# Patient Record
Sex: FEMALE | Race: WHITE | NOT HISPANIC OR LATINO | Employment: OTHER | URBAN - METROPOLITAN AREA
[De-identification: names, ages, dates, MRNs, and addresses within clinical notes are randomized per-mention and may not be internally consistent; named-entity substitution may affect disease eponyms.]

---

## 2022-06-04 ENCOUNTER — HOSPITAL ENCOUNTER (EMERGENCY)
Facility: HOSPITAL | Age: 62
Discharge: HOME | End: 2022-06-05
Attending: GENERAL ACUTE CARE HOSPITAL
Payer: COMMERCIAL

## 2022-06-04 VITALS
HEIGHT: 64 IN | TEMPERATURE: 98 F | BODY MASS INDEX: 13.21 KG/M2 | OXYGEN SATURATION: 99 % | WEIGHT: 77.38 LBS | DIASTOLIC BLOOD PRESSURE: 77 MMHG | RESPIRATION RATE: 20 BRPM | SYSTOLIC BLOOD PRESSURE: 113 MMHG | HEART RATE: 73 BPM
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
## 2022-06-04 DIAGNOSIS — S40.012A CONTUSION OF LEFT SHOULDER, INITIAL ENCOUNTER: ICD-10-CM

## 2022-06-04 PROCEDURE — 99283 EMERGENCY DEPT VISIT LOW MDM: CPT | Mod: 25

## 2022-06-05 NOTE — DISCHARGE INSTRUCTIONS
Follow-up with PCP in 2-3 days for evaluation and examination, otherwise take Tylenol and Motrin as needed for pain

## 2022-06-05 NOTE — ED PROVIDER NOTES
Encounter Date: 6/4/2022       History     Chief Complaint   Patient presents with    Shoulder Pain     Pt c/o L shoulder pain starting today after she fell onto some boxes.     Patient came in emergency room res chief complaints on the left shoulder pain, reports history of fall with trauma 4 hours ago, AROM of left shoulder are preserved but painful, no history of head injury        Review of patient's allergies indicates:   Allergen Reactions    Propoxyphene n-acetaminophen      Other reaction(s): nausea     History reviewed. No pertinent past medical history.  History reviewed. No pertinent surgical history.  History reviewed. No pertinent family history.     Review of Systems   Musculoskeletal: Positive for arthralgias and myalgias.   All other systems reviewed and are negative.      Physical Exam     Initial Vitals [06/04/22 2350]   BP Pulse Resp Temp SpO2   113/77 73 20 97.7 °F (36.5 °C) 99 %      MAP       --         Physical Exam    Nursing note and vitals reviewed.  Constitutional: She appears cachectic.   HENT:   Head: Normocephalic and atraumatic.   Right Ear: External ear normal.   Left Ear: External ear normal.   Nose: Nose normal.   Mouth/Throat: Oropharynx is clear and moist.   Eyes: Conjunctivae and EOM are normal. Pupils are equal, round, and reactive to light.   Neck: Neck supple.   Normal range of motion.  Cardiovascular: Normal rate, regular rhythm and normal heart sounds.   Pulmonary/Chest: Breath sounds normal.   Abdominal: Abdomen is soft. Bowel sounds are normal.   Musculoskeletal:         General: Normal range of motion.      Cervical back: Normal range of motion and neck supple.     Neurological: She is alert and oriented to person, place, and time. She has normal reflexes.   Skin: Skin is warm. Capillary refill takes 2 to 3 seconds.   Psychiatric: She has a normal mood and affect. Her behavior is normal. Judgment and thought content normal.         ED Course   Procedures  Labs Reviewed  - No data to display       Imaging Results          X-Ray Shoulder 2 or More Views Left (In process)               X-Rays:   Independently Interpreted Readings:   Other Readings:  Left shoulder x-ray: no evidence of fracture    Medications - No data to display  Medical Decision Making:   Differential Diagnosis:   Left shoulder fracture, left shoulder contusion             ED Course as of 06/05/22 0033   Sun Jun 05, 2022   0027 X-Ray Shoulder 2 or More Views Left [IP]      ED Course User Index  [IP] Paris Blanton MD             Clinical Impression:   Final diagnoses:  [W19.XXXA] Fall, initial encounter (Primary)  [S40.012A] Contusion of left shoulder, initial encounter          ED Disposition Condition    Discharge Stable        ED Prescriptions     None        Follow-up Information    None          Paris Balnton MD  06/05/22 0034

## 2023-01-01 ENCOUNTER — HOSPITAL ENCOUNTER (EMERGENCY)
Facility: HOSPITAL | Age: 63
Discharge: HOME | End: 2023-01-01
Attending: INTERNAL MEDICINE
Payer: COMMERCIAL

## 2023-01-01 VITALS
SYSTOLIC BLOOD PRESSURE: 136 MMHG | WEIGHT: 77 LBS | RESPIRATION RATE: 16 BRPM | TEMPERATURE: 98 F | OXYGEN SATURATION: 99 % | HEART RATE: 85 BPM | DIASTOLIC BLOOD PRESSURE: 81 MMHG | BODY MASS INDEX: 13.31 KG/M2

## 2023-01-01 DIAGNOSIS — M54.32 SCIATICA OF LEFT SIDE: Primary | ICD-10-CM

## 2023-01-01 DIAGNOSIS — Z04.9 SUSPECTED CONDITION NOT FOUND: ICD-10-CM

## 2023-01-01 PROBLEM — F31.9 BIPOLAR DISORDER: Status: ACTIVE | Noted: 2020-04-17

## 2023-01-01 PROBLEM — F10.11 HISTORY OF ALCOHOL ABUSE: Status: ACTIVE | Noted: 2020-04-17

## 2023-01-01 PROBLEM — J44.9 CHRONIC OBSTRUCTIVE PULMONARY DISEASE: Status: ACTIVE | Noted: 2023-01-01

## 2023-01-01 LAB
ALBUMIN SERPL-MCNC: 3.9 G/DL (ref 3.4–4.8)
ALBUMIN/GLOB SERPL: 1.2 RATIO (ref 1.1–2)
ALP SERPL-CCNC: 78 UNIT/L (ref 40–150)
ALT SERPL-CCNC: 9 UNIT/L (ref 0–55)
APPEARANCE UR: ABNORMAL
AST SERPL-CCNC: 13 UNIT/L (ref 5–34)
BACTERIA #/AREA URNS AUTO: NORMAL /HPF
BASOPHILS # BLD AUTO: 0.1 X10(3)/MCL (ref 0–0.2)
BASOPHILS NFR BLD AUTO: 1.4 %
BILIRUB UR QL STRIP.AUTO: NEGATIVE MG/DL
BILIRUBIN DIRECT+TOT PNL SERPL-MCNC: 0.3 MG/DL
BUN SERPL-MCNC: 11 MG/DL (ref 9.8–20.1)
CALCIUM SERPL-MCNC: 9.9 MG/DL (ref 8.4–10.2)
CHLORIDE SERPL-SCNC: 107 MMOL/L (ref 98–107)
CO2 SERPL-SCNC: 25 MMOL/L (ref 23–31)
COLOR UR AUTO: ABNORMAL
CREAT SERPL-MCNC: 0.75 MG/DL (ref 0.55–1.02)
EOSINOPHIL # BLD AUTO: 0.35 X10(3)/MCL (ref 0–0.9)
EOSINOPHIL NFR BLD AUTO: 4.8 %
ERYTHROCYTE [DISTWIDTH] IN BLOOD BY AUTOMATED COUNT: 14.1 % (ref 11–14.5)
FLUAV AG UPPER RESP QL IA.RAPID: NOT DETECTED
FLUBV AG UPPER RESP QL IA.RAPID: NOT DETECTED
GFR SERPLBLD CREATININE-BSD FMLA CKD-EPI: >60 MLS/MIN/1.73/M2
GLOBULIN SER-MCNC: 3.2 GM/DL (ref 2.4–3.5)
GLUCOSE SERPL-MCNC: 85 MG/DL (ref 82–115)
GLUCOSE UR QL STRIP.AUTO: NEGATIVE MG/DL
HCT VFR BLD AUTO: 40.5 % (ref 37–47)
HEMOCCULT SP1 STL QL: NEGATIVE
HGB BLD-MCNC: 13.4 GM/DL (ref 12–16)
IMM GRANULOCYTES # BLD AUTO: 0.01 X10(3)/MCL (ref 0–0.04)
IMM GRANULOCYTES NFR BLD AUTO: 0.1 %
KETONES UR QL STRIP.AUTO: NEGATIVE MG/DL
LEUKOCYTE ESTERASE UR QL STRIP.AUTO: NEGATIVE UNIT/L
LYMPHOCYTES # BLD AUTO: 2 X10(3)/MCL (ref 0.6–4.6)
LYMPHOCYTES NFR BLD AUTO: 27.4 %
MCH RBC QN AUTO: 31.2 PG
MCHC RBC AUTO-ENTMCNC: 33.1 MG/DL (ref 33–36)
MCV RBC AUTO: 94.4 FL (ref 80–94)
MONOCYTES # BLD AUTO: 0.84 X10(3)/MCL (ref 0.1–1.3)
MONOCYTES NFR BLD AUTO: 11.5 %
NEUTROPHILS # BLD AUTO: 4.01 X10(3)/MCL (ref 2.1–9.2)
NEUTROPHILS NFR BLD AUTO: 54.8 %
NITRITE UR QL STRIP.AUTO: NEGATIVE
PH UR STRIP.AUTO: 7 [PH]
PLATELET # BLD AUTO: 241 X10(3)/MCL (ref 140–371)
PMV BLD AUTO: 10.8 FL (ref 9.4–12.4)
POTASSIUM SERPL-SCNC: 3.6 MMOL/L (ref 3.5–5.1)
PROT SERPL-MCNC: 7.1 GM/DL (ref 5.8–7.6)
PROT UR QL STRIP.AUTO: 30 MG/DL
RBC # BLD AUTO: 4.29 X10(6)/MCL (ref 4.2–5.4)
RBC #/AREA URNS AUTO: NORMAL /HPF
RBC UR QL AUTO: ABNORMAL UNIT/L
RET# (OHS): 0.05 (ref 0.02–0.08)
RETICULOCYTE COUNT AUTOMATED (OLG): 1.27 % (ref 1.1–2.1)
SARS-COV-2 RNA RESP QL NAA+PROBE: NOT DETECTED
SODIUM SERPL-SCNC: 140 MMOL/L (ref 136–145)
SP GR UR STRIP.AUTO: 1.02
SQUAMOUS #/AREA URNS AUTO: NORMAL /HPF
UROBILINOGEN UR STRIP-ACNC: 0.2 MG/DL
WBC # SPEC AUTO: 7.3 X10(3)/MCL (ref 4.5–11.5)
WBC #/AREA URNS AUTO: NORMAL /HPF

## 2023-01-01 PROCEDURE — 99283 EMERGENCY DEPT VISIT LOW MDM: CPT | Mod: 25

## 2023-01-01 PROCEDURE — 85045 AUTOMATED RETICULOCYTE COUNT: CPT | Performed by: INTERNAL MEDICINE

## 2023-01-01 PROCEDURE — 81001 URINALYSIS AUTO W/SCOPE: CPT | Performed by: INTERNAL MEDICINE

## 2023-01-01 PROCEDURE — 80053 COMPREHEN METABOLIC PANEL: CPT | Performed by: INTERNAL MEDICINE

## 2023-01-01 PROCEDURE — 82272 OCCULT BLD FECES 1-3 TESTS: CPT | Performed by: INTERNAL MEDICINE

## 2023-01-01 PROCEDURE — 0240U COVID/FLU A&B PCR: CPT | Performed by: INTERNAL MEDICINE

## 2023-01-01 PROCEDURE — 85025 COMPLETE CBC W/AUTO DIFF WBC: CPT | Performed by: INTERNAL MEDICINE

## 2023-01-01 RX ORDER — TIZANIDINE 4 MG/1
4 TABLET ORAL EVERY 8 HOURS
Qty: 30 TABLET | Refills: 0 | Status: SHIPPED | OUTPATIENT
Start: 2023-01-01 | End: 2023-01-11

## 2023-01-02 NOTE — ED PROVIDER NOTES
01/01/2023         8:47 PM    Source of History:  History obtained from the patient.     Chief complaint:  From Nurse Triage:  Melena (States had 1 episode of black diarrhea today and began 1 week ago with generalized aches worse to lower back and down L leg. Hx of GI bleed. )    HISTORY OF PRESENT ILLNES:  Lucía Iyer is a 62 y.o. female presenting with Melena (States had 1 episode of black diarrhea today and began 1 week ago with generalized aches worse to lower back and down L leg. Hx of GI bleed. )       Patient with history of chronic back pain, history of upper GI bleed due to 3 gastric polyps, which were cauterized 5 years back, comes to the emergency room with complaint of generalized body aches going on for few days, back pain going down the left leg history of sciatica, this morning she had a black stool, which was mainly diarrhea and she took some Imodium and she has not had any bowel movements since then, but in the evening she got scared that she might be having a GI bleed again and decided to come to the emergency room.  Patient denies taking any BC powder, Motrin Advil or any NSAIDs, saying that the she is scared to take all of them because she had GI bleed in the past.  She is also having some hot and cold flashes    REVIEW OF SYSTEMS:   Constitutional symptoms:  No Fever. No Chills  , hot and cold spells  Skin symptoms:  No Rash.    Eye symptoms:  No Visual disturbance reported.   ENMT symptoms:  No Sore throat,    Respiratory symptoms:  No Shortness of Breath, no Cough, no Wheezing.    Cardiovascular symptoms:  No Chest Pain, No Palpitations, No Tachycardia.    Gastrointestinal symptoms:  No Abdominal Pain, No Nausea, No Vomiting, diarrhea x1 episode with the black stools, No Constipation.    Genitourinary symptoms:  No Dysuria,    Musculoskeletal symptoms:  Chronic recurrent back pain going down the left leg,  generalized body aches and muscle aches  Neurologic symptoms:  No Headache, No  Dizziness.    Psychiatric symptoms:  No Anxiety, No Depression, No Substance Abuse.              Additional review of systems information: Patient Denies Any Other Complaints.    All Other Systems Reviewed With Patient And Negative.    ALLEGIES:  Review of patient's allergies indicates:   Allergen Reactions    Propoxyphene n-acetaminophen      Other reaction(s): nausea       MEDICINE LIST:  Current Outpatient Medications   Medication Instructions    tiZANidine (ZANAFLEX) 4 mg, Oral, Every 8 hours        PMH:  As per HPI and below:    Reviewed and updated in chart.    PAST MEDICAL HISTORY:  Past Medical History:   Diagnosis Date    Bipolar disorder, unspecified     Gastric polyp     GERD (gastroesophageal reflux disease)     History of alcoholism 1996    Upper GI bleed         PAST SURGICAL HISTORY:  Past Surgical History:   Procedure Laterality Date    BREAST SURGERY      Gastric polypectomy      HYSTERECTOMY         SOCIAL HISTORY:  Social History     Tobacco Use    Smoking status: Every Day     Packs/day: 1.00     Types: Cigarettes    Smokeless tobacco: Never   Substance Use Topics    Alcohol use: Not Currently    Drug use: Never       FAMILY HISTORY:  Family History   Problem Relation Age of Onset    Diabetes Mother     Hypertension Mother     Hypertension Father     Diabetes Father         PROBLEM LIST:  Patient Active Problem List   Diagnosis    Bipolar disorder    Chronic obstructive pulmonary disease    History of alcohol abuse    Neuralgia of left sciatic nerve        PHYSICAL EXAM:      ED Triage Vitals [01/01/23 1938]   BP (!) 144/86   Pulse 74   Resp 18   Temp 97.9 °F (36.6 °C)   SpO2 98 %        Vital Signs: Reviewed As In Chart.  General:  Alert, No Cardiorespiratory Distress Noted.   Skin: Normal For Ethnic Origin  Eye:  Extraocular Movements Are Intact.   ENT: Mucus membranes are moist.   Cardiovascular:  Regular Rate And Rhythm, No Murmur, No Pedal Edema.    Respiratory:  Respirations Nonlabored, No  Respiratory Distress, Good Bilateral Air Entry, No Rales, No Rhonchi.    Musculoskeletal:  No Gross Deformity Noted.   Gastrointestinal:  Soft, Non Distended, Non Tenderness, Normal Bowel Sounds.  Rectal examination:  Rectal tone is normal, patient has an empty rectum, there is no stool at all.  Neurological:  Alert And Oriented To Person, Place, Time, And Situation, Normal Motor Observed, Normal Speech Observed.    Psychiatric:  Cooperative, Appropriate Mood & Affect.    INITIAL IMPRESSION/ DIFFERENTIAL DX:    MEDICAL DECISION MAKING:      Reviewed Nurses Note. Reviewed Vital Signs.     Reviewed Pertinent old records, History and updated as necessary.    62 y.o. female with Melena (States had 1 episode of black diarrhea today and began 1 week ago with generalized aches worse to lower back and down L leg. Hx of GI bleed. )        MEDICAL DECISION MAKING:    Medical Decision Making  Patient with history of stomach polyp with GI bleed comes to the emergency room with complaint of 1 episode of black stool this morning which got better since then, she is not having any other symptoms ask related to GI bleed but she has been having body aches and back pain for a week or so, she is having hot and cold flashes, she is mainly concerned about GI bleed otherwise she is taking care of everything else at home.    Amount and/or Complexity of Data Reviewed  Labs: ordered. Decision-making details documented in ED Course.    Risk  Risk Details: Patient does not have any blood in the rectum actually does not have any stool at all.  And I do not think that she is having any active GI bleed at this time, I will do the Hemoccult on rectal secretions and blood work and decide further.  I will also do flu and COVID test on her because her symptoms are more consistent with viral syndrome.        ED COURSE AND REEVALUATIONS:  Vitals:    01/01/23 1938   BP: (!) 144/86   Pulse: 74   Resp: 18   Temp: 97.9 °F (36.6 °C)   :  ED Course as of  01/01/23 2144   Sun Jan 01, 2023 2141 Patient's workup in the emergency room is as perfectly normal as it can be, her Hemoccult is negative, her hemoglobin is normal, hematocrit is normal, electrolytes, liver function, kidney function are all normal, retic count is normal, flu and COVID test is negative, I will reassure her and let her go home with instruction to come back to the emergency room in case she started having any dizziness lightheadedness, started throwing up blood or starts passing blood again.  And she should not take any NSAID and I will let her go home. [GQ]      ED Course User Index  [GQ] Matthew Weinstein MD           ED WORKUP FOR DECISION MAKING:  ED ORDERS:  Orders Placed This Encounter   Procedures    Urinalysis, Reflex to Urine Culture Urine, Clean Catch    CBC auto differential    Comprehensive metabolic panel    Occult blood x 3, stool    Reticulocytes    CBC with Differential    Urinalysis, Microscopic    COVID/FLU A&B PCR    Insert Saline lock IV       ED MEDICINES:  Medications - No data to display             ED LABS ORDERED AND REVIEWED:  Admission on 01/01/2023   Component Date Value Ref Range Status    Color, UA 01/01/2023 Dark Yellow  Yellow, Light-Yellow, Dark Yellow, Mary Ann, Straw Final    Appearance, UA 01/01/2023 Slightly Cloudy (A)  Clear Final    Specific Gravity, UA 01/01/2023 1.020   Final    pH, UA 01/01/2023 7.0  5.0 - 8.5 Final    Protein, UA 01/01/2023 30 (A)  Negative mg/dL Final    Glucose, UA 01/01/2023 Negative  Negative, Normal mg/dL Final    Ketones, UA 01/01/2023 Negative  Negative mg/dL Final    Blood, UA 01/01/2023 Trace-Intact (A)  Negative unit/L Final    Bilirubin, UA 01/01/2023 Negative  Negative mg/dL Final    Urobilinogen, UA 01/01/2023 0.2  0.2, 1.0, Normal mg/dL Final    Nitrites, UA 01/01/2023 Negative  Negative Final    Leukocyte Esterase, UA 01/01/2023 Negative  Negative unit/L Final    Sodium Level 01/01/2023 140  136 - 145 mmol/L Final    Potassium  Level 01/01/2023 3.6  3.5 - 5.1 mmol/L Final    Chloride 01/01/2023 107  98 - 107 mmol/L Final    Carbon Dioxide 01/01/2023 25  23 - 31 mmol/L Final    Glucose Level 01/01/2023 85  82 - 115 mg/dL Final    Blood Urea Nitrogen 01/01/2023 11.0  9.8 - 20.1 mg/dL Final    Creatinine 01/01/2023 0.75  0.55 - 1.02 mg/dL Final    Calcium Level Total 01/01/2023 9.9  8.4 - 10.2 mg/dL Final    Protein Total 01/01/2023 7.1  5.8 - 7.6 gm/dL Final    Albumin Level 01/01/2023 3.9  3.4 - 4.8 g/dL Final    Globulin 01/01/2023 3.2  2.4 - 3.5 gm/dL Final    Albumin/Globulin Ratio 01/01/2023 1.2  1.1 - 2.0 ratio Final    Bilirubin Total 01/01/2023 0.3  <=1.5 mg/dL Final    Alkaline Phosphatase 01/01/2023 78  40 - 150 unit/L Final    Alanine Aminotransferase 01/01/2023 9  0 - 55 unit/L Final    Aspartate Aminotransferase 01/01/2023 13  5 - 34 unit/L Final    eGFR 01/01/2023 >60  mls/min/1.73/m2 Final    Occult Blood Stool 1 01/01/2023 Negative  Negative Final    Retic Cnt Auto 01/01/2023 1.27  1.1 - 2.1 % Final    RET# 01/01/2023 0.0545  0.016 - 0.078 Final    WBC 01/01/2023 7.3  4.5 - 11.5 x10(3)/mcL Final    RBC 01/01/2023 4.29  4.20 - 5.40 x10(6)/mcL Final    Hgb 01/01/2023 13.4  12.0 - 16.0 gm/dL Final    Hct 01/01/2023 40.5  37.0 - 47.0 % Final    MCV 01/01/2023 94.4 (H)  80.0 - 94.0 fL Final    MCH 01/01/2023 31.2  pg Final    MCHC 01/01/2023 33.1  33.0 - 36.0 mg/dL Final    RDW 01/01/2023 14.1  11.0 - 14.5 % Final    Platelet 01/01/2023 241  140 - 371 x10(3)/mcL Final    MPV 01/01/2023 10.8  9.4 - 12.4 fL Final    Neut % 01/01/2023 54.8  % Final    Lymph % 01/01/2023 27.4  % Final    Mono % 01/01/2023 11.5  % Final    Eos % 01/01/2023 4.8  % Final    Basophil % 01/01/2023 1.4  % Final    Lymph # 01/01/2023 2.00  0.6 - 4.6 x10(3)/mcL Final    Neut # 01/01/2023 4.01  2.1 - 9.2 x10(3)/mcL Final    Mono # 01/01/2023 0.84  0.1 - 1.3 x10(3)/mcL Final    Eos # 01/01/2023 0.35  0 - 0.9 x10(3)/mcL Final    Baso # 01/01/2023 0.10  0 - 0.2  x10(3)/mcL Final    IG# 01/01/2023 0.01  0 - 0.04 x10(3)/mcL Final    IG% 01/01/2023 0.1  % Final    Bacteria, UA 01/01/2023 None Seen  None Seen, Rare, Occasional /HPF Final    RBC, UA 01/01/2023 None Seen  None Seen, 0-2, 3-5, 0-5 /HPF Final    WBC, UA 01/01/2023 None Seen  None Seen, 0-2, 3-5, 0-5 /HPF Final    Squamous Epithelial Cells, UA 01/01/2023 None Seen  None Seen, Rare, Occasional, Occ /HPF Final    Influenza A PCR 01/01/2023 Not Detected  Not Detected Final    Influenza B PCR 01/01/2023 Not Detected  Not Detected Final    SARS-CoV-2 PCR 01/01/2023 Not Detected  Not Detected Final       RADIOLOGY STUDIES ORDERED AND REVIEWED:  Imaging Results    None         ED COURSE AND REEVALUATIONS:  Vitals:    01/01/23 1938   BP: (!) 144/86   Pulse: 74   Resp: 18   Temp: 97.9 °F (36.6 °C)       PROCEDURES PERFORMED IN ED:  Procedures    DIAGNOSTIC IMPRESSION:        ICD-10-CM ICD-9-CM   1. Sciatica of left side  M54.32 724.3   2. Suspected condition not found  Z04.9 V71.9         ED Disposition Condition    Discharge Stable               Medication List        START taking these medications      tiZANidine 4 MG tablet  Commonly known as: ZANAFLEX  Take 1 tablet (4 mg total) by mouth every 8 (eight) hours. for 10 days               Where to Get Your Medications        You can get these medications from any pharmacy    Bring a paper prescription for each of these medications  tiZANidine 4 MG tablet           Follow-up Information       PMD In 2 days.                              ED Prescriptions       Medication Sig Dispense Start Date End Date Auth. Provider    tiZANidine (ZANAFLEX) 4 MG tablet Take 1 tablet (4 mg total) by mouth every 8 (eight) hours. for 10 days 30 tablet 1/1/2023 1/11/2023 Matthew Weinstein MD          Follow-up Information       Follow up With Specialties Details Why Contact Info    PMD  In 2 days                 Matthew Weinstein MD  01/01/23 3732

## 2023-01-25 ENCOUNTER — HOSPITAL ENCOUNTER (EMERGENCY)
Facility: HOSPITAL | Age: 63
Discharge: HOME | End: 2023-01-25
Attending: INTERNAL MEDICINE
Payer: COMMERCIAL

## 2023-01-25 VITALS
HEIGHT: 64 IN | DIASTOLIC BLOOD PRESSURE: 72 MMHG | BODY MASS INDEX: 13.32 KG/M2 | OXYGEN SATURATION: 96 % | HEART RATE: 86 BPM | RESPIRATION RATE: 17 BRPM | TEMPERATURE: 98 F | WEIGHT: 78 LBS | SYSTOLIC BLOOD PRESSURE: 122 MMHG

## 2023-01-25 DIAGNOSIS — E83.42 HYPOMAGNESEMIA: ICD-10-CM

## 2023-01-25 DIAGNOSIS — A08.4 VIRAL GASTROENTERITIS: Primary | ICD-10-CM

## 2023-01-25 LAB
ALBUMIN SERPL-MCNC: 4 G/DL (ref 3.4–4.8)
ALBUMIN/GLOB SERPL: 1.2 RATIO (ref 1.1–2)
ALP SERPL-CCNC: 87 UNIT/L (ref 40–150)
ALT SERPL-CCNC: 14 UNIT/L (ref 0–55)
APPEARANCE UR: CLEAR
AST SERPL-CCNC: 17 UNIT/L (ref 5–34)
BACTERIA #/AREA URNS AUTO: ABNORMAL /HPF
BASOPHILS # BLD AUTO: 0.04 X10(3)/MCL (ref 0–0.2)
BASOPHILS NFR BLD AUTO: 0.3 %
BILIRUB UR QL STRIP.AUTO: NEGATIVE MG/DL
BILIRUBIN DIRECT+TOT PNL SERPL-MCNC: 0.5 MG/DL
BUN SERPL-MCNC: 16 MG/DL (ref 9.8–20.1)
CALCIUM SERPL-MCNC: 9 MG/DL (ref 8.4–10.2)
CHLORIDE SERPL-SCNC: 109 MMOL/L (ref 98–107)
CO2 SERPL-SCNC: 20 MMOL/L (ref 23–31)
COLOR UR AUTO: YELLOW
CREAT SERPL-MCNC: 0.81 MG/DL (ref 0.55–1.02)
EOSINOPHIL # BLD AUTO: 0.05 X10(3)/MCL (ref 0–0.9)
EOSINOPHIL NFR BLD AUTO: 0.3 %
ERYTHROCYTE [DISTWIDTH] IN BLOOD BY AUTOMATED COUNT: 14.1 % (ref 11.5–17)
GFR SERPLBLD CREATININE-BSD FMLA CKD-EPI: >60 MLS/MIN/1.73/M2
GLOBULIN SER-MCNC: 3.4 GM/DL (ref 2.4–3.5)
GLUCOSE SERPL-MCNC: 123 MG/DL (ref 82–115)
GLUCOSE UR QL STRIP.AUTO: NEGATIVE MG/DL
HCT VFR BLD AUTO: 43.6 % (ref 37–47)
HGB BLD-MCNC: 14.3 GM/DL (ref 12–16)
IMM GRANULOCYTES # BLD AUTO: 0.05 X10(3)/MCL (ref 0–0.04)
IMM GRANULOCYTES NFR BLD AUTO: 0.3 %
KETONES UR QL STRIP.AUTO: NEGATIVE MG/DL
LEUKOCYTE ESTERASE UR QL STRIP.AUTO: NEGATIVE UNIT/L
LIPASE SERPL-CCNC: 22 U/L
LYMPHOCYTES # BLD AUTO: 0.61 X10(3)/MCL (ref 0.6–4.6)
LYMPHOCYTES NFR BLD AUTO: 4.1 %
MAGNESIUM SERPL-MCNC: 1.4 MG/DL (ref 1.6–2.6)
MCH RBC QN AUTO: 31.2 PG
MCHC RBC AUTO-ENTMCNC: 32.8 MG/DL (ref 33–36)
MCV RBC AUTO: 95 FL (ref 80–94)
MONOCYTES # BLD AUTO: 0.49 X10(3)/MCL (ref 0.1–1.3)
MONOCYTES NFR BLD AUTO: 3.3 %
NEUTROPHILS # BLD AUTO: 13.64 X10(3)/MCL (ref 2.1–9.2)
NEUTROPHILS NFR BLD AUTO: 91.7 %
NITRITE UR QL STRIP.AUTO: NEGATIVE
PH UR STRIP.AUTO: 6 [PH]
PLATELET # BLD AUTO: 263 X10(3)/MCL (ref 130–400)
PMV BLD AUTO: 11.2 FL (ref 7.4–10.4)
POTASSIUM SERPL-SCNC: 4 MMOL/L (ref 3.5–5.1)
PROT SERPL-MCNC: 7.4 GM/DL (ref 5.8–7.6)
PROT UR QL STRIP.AUTO: 30 MG/DL
RBC # BLD AUTO: 4.59 X10(6)/MCL (ref 4.2–5.4)
RBC #/AREA URNS AUTO: ABNORMAL /HPF
RBC UR QL AUTO: ABNORMAL UNIT/L
SODIUM SERPL-SCNC: 138 MMOL/L (ref 136–145)
SP GR UR STRIP.AUTO: 1.01
SQUAMOUS #/AREA URNS AUTO: ABNORMAL /HPF
UROBILINOGEN UR STRIP-ACNC: 0.2 MG/DL
WBC # SPEC AUTO: 14.9 X10(3)/MCL (ref 4.5–11.5)
WBC #/AREA URNS AUTO: ABNORMAL /HPF

## 2023-01-25 PROCEDURE — 83735 ASSAY OF MAGNESIUM: CPT | Performed by: PHYSICIAN ASSISTANT

## 2023-01-25 PROCEDURE — 85025 COMPLETE CBC W/AUTO DIFF WBC: CPT | Performed by: PHYSICIAN ASSISTANT

## 2023-01-25 PROCEDURE — 63600175 PHARM REV CODE 636 W HCPCS: Performed by: PHYSICIAN ASSISTANT

## 2023-01-25 PROCEDURE — 99284 EMERGENCY DEPT VISIT MOD MDM: CPT | Mod: 25

## 2023-01-25 PROCEDURE — 96365 THER/PROPH/DIAG IV INF INIT: CPT

## 2023-01-25 PROCEDURE — 80053 COMPREHEN METABOLIC PANEL: CPT | Performed by: PHYSICIAN ASSISTANT

## 2023-01-25 PROCEDURE — 96375 TX/PRO/DX INJ NEW DRUG ADDON: CPT

## 2023-01-25 PROCEDURE — 96372 THER/PROPH/DIAG INJ SC/IM: CPT | Performed by: PHYSICIAN ASSISTANT

## 2023-01-25 PROCEDURE — 96366 THER/PROPH/DIAG IV INF ADDON: CPT

## 2023-01-25 PROCEDURE — 83690 ASSAY OF LIPASE: CPT | Performed by: PHYSICIAN ASSISTANT

## 2023-01-25 PROCEDURE — 96361 HYDRATE IV INFUSION ADD-ON: CPT

## 2023-01-25 PROCEDURE — 81001 URINALYSIS AUTO W/SCOPE: CPT | Performed by: PHYSICIAN ASSISTANT

## 2023-01-25 RX ORDER — MAGNESIUM SULFATE HEPTAHYDRATE 40 MG/ML
2 INJECTION, SOLUTION INTRAVENOUS
Status: COMPLETED | OUTPATIENT
Start: 2023-01-25 | End: 2023-01-25

## 2023-01-25 RX ORDER — DICYCLOMINE HYDROCHLORIDE 20 MG/1
20 TABLET ORAL 4 TIMES DAILY
Qty: 28 TABLET | Refills: 0 | Status: SHIPPED | OUTPATIENT
Start: 2023-01-25 | End: 2023-02-01

## 2023-01-25 RX ORDER — ONDANSETRON 4 MG/1
4 TABLET, ORALLY DISINTEGRATING ORAL EVERY 6 HOURS PRN
Qty: 20 TABLET | Refills: 0 | Status: SHIPPED | OUTPATIENT
Start: 2023-01-25

## 2023-01-25 RX ORDER — DICYCLOMINE HYDROCHLORIDE 10 MG/ML
20 INJECTION INTRAMUSCULAR
Status: COMPLETED | OUTPATIENT
Start: 2023-01-25 | End: 2023-01-25

## 2023-01-25 RX ORDER — ONDANSETRON 2 MG/ML
4 INJECTION INTRAMUSCULAR; INTRAVENOUS
Status: COMPLETED | OUTPATIENT
Start: 2023-01-25 | End: 2023-01-25

## 2023-01-25 RX ADMIN — SODIUM CHLORIDE, POTASSIUM CHLORIDE, SODIUM LACTATE AND CALCIUM CHLORIDE 1000 ML: 600; 310; 30; 20 INJECTION, SOLUTION INTRAVENOUS at 04:01

## 2023-01-25 RX ADMIN — ONDANSETRON 4 MG: 2 INJECTION INTRAMUSCULAR; INTRAVENOUS at 04:01

## 2023-01-25 RX ADMIN — MAGNESIUM SULFATE HEPTAHYDRATE 2 G: 40 INJECTION, SOLUTION INTRAVENOUS at 05:01

## 2023-01-25 RX ADMIN — DICYCLOMINE HYDROCHLORIDE 20 MG: 20 INJECTION, SOLUTION INTRAMUSCULAR at 05:01

## 2023-01-25 NOTE — ED PROVIDER NOTES
Encounter Date: 1/25/2023       History     Chief Complaint   Patient presents with    Diarrhea     NVD started last night     62-year-old female presents to ED for evaluation of nausea, vomiting, diarrhea since midnight last night.  Patient reports to abdominal cramping.  States she may have eaten some bad food.  Patient reports having so much diarrhea that she had take Imodium.  Denies any blood in her stool.  Denies any fever.    The history is provided by the patient and the EMS personnel. No  was used.   Review of patient's allergies indicates:   Allergen Reactions    Propoxyphene n-acetaminophen      Other reaction(s): nausea     Past Medical History:   Diagnosis Date    Bipolar disorder, unspecified     Emphysema, unspecified     Gastric polyp     GERD (gastroesophageal reflux disease)     History of alcoholism 1996    Upper GI bleed      Past Surgical History:   Procedure Laterality Date    APPENDECTOMY      BACK SURGERY      BREAST SURGERY      Gastric polypectomy      HYSTERECTOMY       Family History   Problem Relation Age of Onset    Diabetes Mother     Hypertension Mother     Hypertension Father     Diabetes Father      Social History     Tobacco Use    Smoking status: Every Day     Packs/day: 1.00     Types: Cigarettes    Smokeless tobacco: Never   Substance Use Topics    Alcohol use: Not Currently    Drug use: Never     Review of Systems   Constitutional:  Negative for chills, fatigue and fever.   Respiratory:  Negative for shortness of breath.    Cardiovascular:  Negative for chest pain.   Gastrointestinal:  Positive for abdominal pain, diarrhea, nausea and vomiting.   Genitourinary:  Negative for dysuria, flank pain, frequency, pelvic pain and urgency.   Musculoskeletal:  Negative for myalgias.   All other systems reviewed and are negative.    Physical Exam     Initial Vitals [01/25/23 1552]   BP Pulse Resp Temp SpO2   135/70 96 15 98.1 °F (36.7 °C) 95 %      MAP       --          Physical Exam    Vitals reviewed.  Constitutional: She appears well-developed.   HENT:   Head: Normocephalic and atraumatic.   Right Ear: External ear normal.   Left Ear: External ear normal.   Eyes: Conjunctivae are normal. Pupils are equal, round, and reactive to light.   Neck: Neck supple.   Normal range of motion.  Cardiovascular:  Normal rate, regular rhythm and normal heart sounds.           Pulmonary/Chest: Breath sounds normal. She has no wheezes. She has no rhonchi. She has no rales.   Abdominal: Abdomen is soft. Bowel sounds are normal. There is no abdominal tenderness. There is no rebound and no guarding.   Musculoskeletal:         General: Normal range of motion.      Cervical back: Normal range of motion and neck supple.     Neurological: She is alert and oriented to person, place, and time.   Skin: Skin is warm and dry.   Psychiatric: She has a normal mood and affect.       ED Course   Procedures  Labs Reviewed   COMPREHENSIVE METABOLIC PANEL - Abnormal; Notable for the following components:       Result Value    Chloride 109 (*)     Carbon Dioxide 20 (*)     Glucose Level 123 (*)     All other components within normal limits   MAGNESIUM - Abnormal; Notable for the following components:    Magnesium Level 1.40 (*)     All other components within normal limits   URINALYSIS, REFLEX TO URINE CULTURE - Abnormal; Notable for the following components:    Protein, UA 30 (*)     Blood, UA Small (*)     All other components within normal limits   CBC WITH DIFFERENTIAL - Abnormal; Notable for the following components:    WBC 14.9 (*)     MCV 95.0 (*)     MCHC 32.8 (*)     MPV 11.2 (*)     Neut # 13.64 (*)     IG# 0.05 (*)     All other components within normal limits   URINALYSIS, MICROSCOPIC - Abnormal; Notable for the following components:    Squamous Epithelial Cells, UA Few (*)     All other components within normal limits   LIPASE - Normal   CBC W/ AUTO DIFFERENTIAL    Narrative:     The following  orders were created for panel order CBC auto differential.  Procedure                               Abnormality         Status                     ---------                               -----------         ------                     CBC with Differential[[id number]]        Abnormal            Final result                 Please view results for these tests on the individual orders.          Imaging Results    None          Medications   magnesium sulfate 2g in water 50mL IVPB (premix) (2 g Intravenous New Bag 1/25/23 1738)   lactated ringers bolus 1,000 mL (1,000 mLs Intravenous New Bag 1/25/23 1621)   ondansetron injection 4 mg (4 mg Intravenous Given 1/25/23 1620)   dicyclomine injection 20 mg (20 mg Intramuscular Given 1/25/23 1726)     Medical Decision Making:   Differential Diagnosis:   Gastroenteritis, viral gastroenteritis abdominal pain, cholecystitis, pancreatitis  Clinical Tests:   Lab Tests: Ordered and Reviewed  ED Management:  Patient afebrile and in no acute distress.  Remained hemodynamically stable throughout ED stay.  Abdomen soft nontender nonsurgical Patient presented with nausea, vomiting, and diarrhea since this morning.  Patient feeling better after IV fluids Zofran and Bentyl.  Mild dehydration noted with low magnesium.  Given IV Mag replacement.  Will discharge home with short course of symptomatic care including Zofran Bentyl.           ED Course as of 01/25/23 1903 Wed Jan 25, 2023   1729 Patient reports to feeling better after IVF and zofran. Will replace Magnesium. [SL]      ED Course User Index  [SL] HUGO Bergeron                 Clinical Impression:   Final diagnoses:  [A08.4] Viral gastroenteritis (Primary)  [E83.42] Hypomagnesemia        ED Disposition Condition    Discharge Stable          ED Prescriptions       Medication Sig Dispense Start Date End Date Auth. Provider    dicyclomine (BENTYL) 20 mg tablet Take 1 tablet (20 mg total) by mouth 4 (four) times daily. for 7 days 28  tablet 1/25/2023 2/1/2023 HUGO Bergeron    ondansetron (ZOFRAN-ODT) 4 MG TbDL Take 1 tablet (4 mg total) by mouth every 6 (six) hours as needed. 20 tablet 1/25/2023 -- HUGO Bergeron          Follow-up Information       Follow up With Specialties Details Why Contact Info    PCP  In 1 week As needed              HUGO Bergeron  01/25/23 2006

## 2024-08-14 ENCOUNTER — HOSPITAL ENCOUNTER (OUTPATIENT)
Dept: RADIOLOGY | Facility: HOSPITAL | Age: 64
Discharge: HOME | End: 2024-08-14
Attending: NURSE PRACTITIONER
Payer: MEDICARE

## 2024-08-14 DIAGNOSIS — Z12.31 ENCOUNTER FOR SCREENING MAMMOGRAM FOR MALIGNANT NEOPLASM OF BREAST: ICD-10-CM

## 2024-08-14 PROCEDURE — 77067 SCR MAMMO BI INCL CAD: CPT | Mod: TC

## 2024-10-21 DIAGNOSIS — Z87.891 PERSONAL HISTORY OF TOBACCO USE, PRESENTING HAZARDS TO HEALTH: Primary | ICD-10-CM

## 2024-11-01 ENCOUNTER — HOSPITAL ENCOUNTER (OUTPATIENT)
Dept: RADIOLOGY | Facility: HOSPITAL | Age: 64
Discharge: HOME | End: 2024-11-01
Attending: NURSE PRACTITIONER
Payer: MEDICARE

## 2024-11-01 DIAGNOSIS — Z87.891 PERSONAL HISTORY OF TOBACCO USE, PRESENTING HAZARDS TO HEALTH: ICD-10-CM

## 2024-11-01 PROCEDURE — 71271 CT THORAX LUNG CANCER SCR C-: CPT | Mod: TC

## 2025-03-28 ENCOUNTER — HOSPITAL ENCOUNTER (OUTPATIENT)
Dept: RADIOLOGY | Facility: HOSPITAL | Age: 65
Discharge: HOME | End: 2025-03-28
Attending: NURSE PRACTITIONER
Payer: MEDICARE

## 2025-03-28 DIAGNOSIS — R52 PAIN: ICD-10-CM

## 2025-03-28 PROCEDURE — 72100 X-RAY EXAM L-S SPINE 2/3 VWS: CPT | Mod: TC

## 2025-05-19 ENCOUNTER — HOSPITAL ENCOUNTER (OUTPATIENT)
Dept: RADIOLOGY | Facility: HOSPITAL | Age: 65
Discharge: HOME | End: 2025-05-19
Attending: NURSE PRACTITIONER
Payer: MEDICARE

## 2025-05-19 DIAGNOSIS — R91.1 PULMONARY NODULE: ICD-10-CM

## 2025-05-19 PROCEDURE — 71250 CT THORAX DX C-: CPT | Mod: TC

## 2025-07-29 ENCOUNTER — HOSPITAL ENCOUNTER (OUTPATIENT)
Dept: RADIOLOGY | Facility: HOSPITAL | Age: 65
Discharge: HOME | End: 2025-07-29
Attending: NURSE PRACTITIONER
Payer: MEDICARE

## 2025-07-29 DIAGNOSIS — R52 PAIN: ICD-10-CM

## 2025-07-29 DIAGNOSIS — M54.2 NECK PAIN: ICD-10-CM

## 2025-07-29 PROCEDURE — 72040 X-RAY EXAM NECK SPINE 2-3 VW: CPT | Mod: TC

## 2025-07-29 PROCEDURE — 73030 X-RAY EXAM OF SHOULDER: CPT | Mod: TC,LT
